# Patient Record
Sex: FEMALE | Race: WHITE | Employment: OTHER | URBAN - NONMETROPOLITAN AREA
[De-identification: names, ages, dates, MRNs, and addresses within clinical notes are randomized per-mention and may not be internally consistent; named-entity substitution may affect disease eponyms.]

---

## 2019-05-09 ENCOUNTER — HOSPITAL ENCOUNTER (EMERGENCY)
Facility: OTHER | Age: 74
Discharge: HOME | End: 2019-05-09
Attending: PHYSICIAN ASSISTANT | Admitting: PHYSICIAN ASSISTANT
Payer: MEDICARE

## 2019-05-09 VITALS
RESPIRATION RATE: 18 BRPM | TEMPERATURE: 98 F | BODY MASS INDEX: 25.76 KG/M2 | HEART RATE: 101 BPM | HEIGHT: 62 IN | OXYGEN SATURATION: 96 % | SYSTOLIC BLOOD PRESSURE: 165 MMHG | DIASTOLIC BLOOD PRESSURE: 78 MMHG | WEIGHT: 140 LBS

## 2019-05-09 DIAGNOSIS — H10.9 BACTERIAL CONJUNCTIVITIS OF BOTH EYES: ICD-10-CM

## 2019-05-09 DIAGNOSIS — B96.89 BACTERIAL CONJUNCTIVITIS OF BOTH EYES: ICD-10-CM

## 2019-05-09 PROCEDURE — 25000132 ZZH RX MED GY IP 250 OP 250 PS 637: Performed by: PHYSICIAN ASSISTANT

## 2019-05-09 PROCEDURE — 99282 EMERGENCY DEPT VISIT SF MDM: CPT | Performed by: PHYSICIAN ASSISTANT

## 2019-05-09 PROCEDURE — 99282 EMERGENCY DEPT VISIT SF MDM: CPT | Mod: Z6 | Performed by: PHYSICIAN ASSISTANT

## 2019-05-09 RX ORDER — LOSARTAN POTASSIUM 25 MG/1
50 TABLET ORAL DAILY
COMMUNITY

## 2019-05-09 RX ORDER — POLYMYXIN B SULFATE AND TRIMETHOPRIM 1; 10000 MG/ML; [USP'U]/ML
2 SOLUTION OPHTHALMIC EVERY 4 HOURS
Qty: 1 BOTTLE | Refills: 0 | Status: SHIPPED | OUTPATIENT
Start: 2019-05-09

## 2019-05-09 RX ORDER — ALPRAZOLAM 0.5 MG
0.5 TABLET ORAL 3 TIMES DAILY PRN
COMMUNITY

## 2019-05-09 RX ORDER — ASPIRIN 81 MG/1
81 TABLET, CHEWABLE ORAL 2 TIMES DAILY
COMMUNITY

## 2019-05-09 RX ORDER — POLYMYXIN B SULFATE AND TRIMETHOPRIM 1; 10000 MG/ML; [USP'U]/ML
2 SOLUTION OPHTHALMIC 4 TIMES DAILY
Status: DISCONTINUED | OUTPATIENT
Start: 2019-05-09 | End: 2019-05-10 | Stop reason: HOSPADM

## 2019-05-09 RX ADMIN — POLYMYXIN B SULFATE, TRIMETHOPRIM SULFATE 2 DROP: 10000; 1 SOLUTION/ DROPS OPHTHALMIC at 21:25

## 2019-05-09 ASSESSMENT — ENCOUNTER SYMPTOMS
FEVER: 0
EYE DISCHARGE: 1
HEMATURIA: 0
ADENOPATHY: 0
CHEST TIGHTNESS: 0
WOUND: 0
SHORTNESS OF BREATH: 0
BRUISES/BLEEDS EASILY: 0
EYE PAIN: 1
EYE REDNESS: 1
CONFUSION: 0
BACK PAIN: 0
ABDOMINAL PAIN: 0
CHILLS: 0
EYE ITCHING: 1

## 2019-05-09 ASSESSMENT — MIFFLIN-ST. JEOR: SCORE: 1103.29

## 2019-05-09 NOTE — ED AVS SNAPSHOT
[facility]  [address]  [place] [address]  Phone:  [phone]  Fax:  [phone]                                    Marianne Boland   MRN: [id number]    Department:  [facility]   Date of Visit:  5/9/2019           After Visit Summary Signature Page    I have received my discharge instructions, and my questions have been answered. I have discussed any challenges I see with this plan with the nurse or doctor.    ..........................................................................................................................................  Patient/Patient Representative Signature      ..........................................................................................................................................  Patient Representative Print Name and Relationship to Patient    ..................................................               ................................................  Date                                   Time    ..........................................................................................................................................  Reviewed by Signature/Title    ...................................................              ..............................................  Date                                               Time          22EPIC Rev 08/18

## 2019-05-10 NOTE — ED TRIAGE NOTES
Pt c/o eye irritation and pus in right eye but left eye is starting to feel like the right. 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 She was with grand daughter yesterday who has URI

## 2019-05-10 NOTE — ED PROVIDER NOTES
History     Chief Complaint   Patient presents with     Conjunctivitis     This is a 73-year-old female who is here from [place] visiting her mother at [facility] for Mother's Day.  She has been having some irritation to her right eye and has noticed some mattery pus coming out of it as well.  She feels this is starting to spread to her left eye as well.  She reports she was with her granddaughter yesterday and and it seemed to start after that.  Her granddaughter had an upper respiratory infection.  Patient denies any fever or chills.  No sore throat or cough.  No visual changes.  No shortness of breath or chest pain.  No lightheadedness or dizziness.            Allergies:  Allergies   Allergen Reactions     Macrobid [Nitrofurantoin] Nausea     Sulfa Drugs Rash       Problem List:    There are no active problems to display for this patient.       Past Medical History:    No past medical history on file.    Past Surgical History:    No past surgical history on file.    Family History:    No family history on file.    Social History:  Marital Status:    Social History     Tobacco Use     Smoking status: Not on file   Substance Use Topics     Alcohol use: Not on file     Drug use: Not on file        Medications:      ALPRAZolam (XANAX) 0.5 MG tablet   aspirin (ASA) 81 MG chewable tablet   losartan (COZAAR) 25 MG tablet   trimethoprim-polymyxin b (POLYTRIM) 37968-8.1 UNIT/ML-% ophthalmic solution         Review of Systems   Constitutional: Negative for chills and fever.   HENT: Negative for congestion.    Eyes: Positive for pain, discharge, redness and itching. Negative for visual disturbance.   Respiratory: Negative for chest tightness and shortness of breath.    Cardiovascular: Negative for chest pain.   Gastrointestinal: Negative for abdominal pain.   Genitourinary: Negative for hematuria.   Musculoskeletal: Negative for back pain.   Skin: Negative for rash and wound.   Neurological: Negative for syncope.  "  Hematological: Negative for adenopathy. Does not bruise/bleed easily.   Psychiatric/Behavioral: Negative for confusion.       Physical Exam   BP: 165/78  Pulse: 101  Temp: 98  F (36.7  C)  Resp: 18  Height: 157.5 cm (5' 2\")  Weight: 63.5 kg (140 lb)  SpO2: 96 %      Physical Exam   Constitutional: She appears well-developed and well-nourished. No distress.   HENT:   Head: Normocephalic and atraumatic.   Eyes: EOM are normal. Right eye exhibits discharge and exudate. Left eye exhibits no discharge and no exudate. Right conjunctiva is injected. Left conjunctiva is injected. No scleral icterus. Right eye exhibits normal extraocular motion and no nystagmus. Left eye exhibits normal extraocular motion and no nystagmus. Right pupil is round and reactive. Left pupil is round and reactive. Pupils are equal.       Purulent mattering discharge coming from the lacrimal gland of the right eye bilateral redness to the conjunctive.  No visual changes.   Neck: Neck supple.   Cardiovascular: Normal rate and regular rhythm.   Pulmonary/Chest: Effort normal.   Abdominal: Soft. There is no tenderness.   Musculoskeletal: She exhibits no deformity.   Lymphadenopathy:     She has no cervical adenopathy.   Neurological: She is alert.   Skin: Skin is warm and dry. No rash noted. She is not diaphoretic.   Psychiatric: She has a normal mood and affect.       ED Course        Procedures            No results found for this or any previous visit (from the past 24 hour(s)).    Medications   trimethoprim-polymyxin b (POLYTRIM) ophthalmic solution 2 drop (2 drops Both Eyes Given 5/9/19 2125)       Assessments & Plan (with Medical Decision Making)     I have reviewed the nursing notes.    I have reviewed the findings, diagnosis, plan and need for follow up with the patient.         Medication List      Started    trimethoprim-polymyxin b 83646-2.1 UNIT/ML-% ophthalmic solution  Commonly known as:  POLYTRIM  2 drops, Both Eyes, EVERY 4 HOURS      "       Final diagnoses:   Bacterial conjunctivitis of both eyes - right worse than the left     Afebrile.  Vital signs stable.  Bilateral conjunctivitis right worse than left with purulent mattery discharge.  Polytrim was applied to both eyes at this time.  I discussed warm compresses to help with the mattery discharge.  I discussed the severe contagiousness of bacterial conjunctivitis and the importance of good handwashing techniques.  Rx for Polytrim eyedrops as well.  Continue to monitor and follow-up with her primary care provider if symptoms persist for further evaluation as needed.  5/9/2019   [facility]     Kyle Lopez PA-C  05/09/19 [id number]

## 2024-11-26 ENCOUNTER — HOSPITAL ENCOUNTER (OUTPATIENT)
Dept: ULTRASOUND IMAGING | Age: 79
Discharge: HOME | End: 2024-11-26
Attending: FAMILY MEDICINE

## 2024-11-26 ENCOUNTER — ANCILLARY PROCEDURE (OUTPATIENT)
Dept: CARDIOLOGY | Age: 79
End: 2024-11-26
Attending: FAMILY MEDICINE

## 2024-11-26 DIAGNOSIS — I77.9 CAROTID ARTERY DISEASE (CMD): ICD-10-CM

## 2024-11-26 DIAGNOSIS — R22.1 NECK MASS: ICD-10-CM

## 2024-11-26 DIAGNOSIS — I34.0 MITRAL REGURGITATION: ICD-10-CM

## 2024-11-26 PROCEDURE — 93880 EXTRACRANIAL BILAT STUDY: CPT | Performed by: INTERNAL MEDICINE

## 2024-11-26 PROCEDURE — 76536 US EXAM OF HEAD AND NECK: CPT

## (undated) RX ORDER — POLYMYXIN B SULFATE AND TRIMETHOPRIM 1; 10000 MG/ML; [USP'U]/ML
SOLUTION OPHTHALMIC
Status: DISPENSED
Start: 2019-05-09